# Patient Record
Sex: FEMALE | Race: WHITE | NOT HISPANIC OR LATINO | Employment: UNEMPLOYED | URBAN - NONMETROPOLITAN AREA
[De-identification: names, ages, dates, MRNs, and addresses within clinical notes are randomized per-mention and may not be internally consistent; named-entity substitution may affect disease eponyms.]

---

## 2017-01-09 ENCOUNTER — TRANSCRIBE ORDERS (OUTPATIENT)
Dept: CARDIOLOGY | Facility: CLINIC | Age: 47
End: 2017-01-09

## 2017-01-09 DIAGNOSIS — R00.0 TACHYCARDIA: Primary | ICD-10-CM

## 2017-02-02 ENCOUNTER — CONSULT (OUTPATIENT)
Dept: CARDIOLOGY | Facility: CLINIC | Age: 47
End: 2017-02-02

## 2017-02-02 VITALS
WEIGHT: 265 LBS | HEIGHT: 68 IN | SYSTOLIC BLOOD PRESSURE: 120 MMHG | HEART RATE: 84 BPM | DIASTOLIC BLOOD PRESSURE: 78 MMHG | BODY MASS INDEX: 40.16 KG/M2

## 2017-02-02 DIAGNOSIS — R06.02 SHORTNESS OF BREATH: ICD-10-CM

## 2017-02-02 DIAGNOSIS — I10 ESSENTIAL HYPERTENSION: ICD-10-CM

## 2017-02-02 DIAGNOSIS — E78.00 HYPERCHOLESTEREMIA: ICD-10-CM

## 2017-02-02 DIAGNOSIS — R00.2 PALPITATIONS: ICD-10-CM

## 2017-02-02 DIAGNOSIS — R53.82 CHRONIC FATIGUE: ICD-10-CM

## 2017-02-02 DIAGNOSIS — E11.9 TYPE 2 DIABETES MELLITUS WITHOUT COMPLICATION, WITHOUT LONG-TERM CURRENT USE OF INSULIN (HCC): ICD-10-CM

## 2017-02-02 DIAGNOSIS — E03.9 ACQUIRED HYPOTHYROIDISM: ICD-10-CM

## 2017-02-02 DIAGNOSIS — R07.89 CHEST TIGHTNESS: Primary | ICD-10-CM

## 2017-02-02 DIAGNOSIS — E88.81 METABOLIC SYNDROME: ICD-10-CM

## 2017-02-02 PROCEDURE — 99406 BEHAV CHNG SMOKING 3-10 MIN: CPT | Performed by: INTERNAL MEDICINE

## 2017-02-02 PROCEDURE — 99204 OFFICE O/P NEW MOD 45 MIN: CPT | Performed by: INTERNAL MEDICINE

## 2017-02-02 PROCEDURE — 93000 ELECTROCARDIOGRAM COMPLETE: CPT | Performed by: INTERNAL MEDICINE

## 2017-02-02 RX ORDER — DIAZEPAM 10 MG/1
10 TABLET ORAL 2 TIMES DAILY
COMMUNITY

## 2017-02-02 RX ORDER — MONTELUKAST SODIUM 10 MG/1
10 TABLET ORAL NIGHTLY
COMMUNITY

## 2017-02-02 RX ORDER — PROCHLORPERAZINE MALEATE 10 MG
10 TABLET ORAL 3 TIMES DAILY PRN
COMMUNITY

## 2017-02-02 RX ORDER — HYDROCODONE BITARTRATE AND ACETAMINOPHEN 7.5; 325 MG/1; MG/1
1 TABLET ORAL 2 TIMES DAILY
COMMUNITY

## 2017-02-02 RX ORDER — HYDROCHLOROTHIAZIDE 12.5 MG/1
12.5 TABLET ORAL DAILY
COMMUNITY

## 2017-02-02 RX ORDER — NICOTINE 21 MG/24HR
1 PATCH, TRANSDERMAL 24 HOURS TRANSDERMAL EVERY 24 HOURS
Qty: 30 PATCH | Refills: 8 | Status: SHIPPED | OUTPATIENT
Start: 2017-02-02

## 2017-02-02 RX ORDER — LEVOTHYROXINE SODIUM 0.07 MG/1
75 TABLET ORAL DAILY
COMMUNITY

## 2017-02-02 RX ORDER — QUETIAPINE FUMARATE 25 MG/1
25 TABLET, FILM COATED ORAL NIGHTLY
COMMUNITY

## 2017-02-02 RX ORDER — CHLORPHENIRAMINE MALEATE 4 MG/1
4 TABLET ORAL 2 TIMES DAILY
COMMUNITY

## 2017-02-02 RX ORDER — ALLOPURINOL 300 MG/1
300 TABLET ORAL DAILY
COMMUNITY

## 2017-02-02 RX ORDER — DICYCLOMINE HCL 20 MG
20 TABLET ORAL
COMMUNITY

## 2017-02-02 RX ORDER — TIZANIDINE 4 MG/1
4 TABLET ORAL 3 TIMES DAILY PRN
COMMUNITY

## 2017-02-02 RX ORDER — FEBUXOSTAT 40 MG/1
40 TABLET, FILM COATED ORAL DAILY
COMMUNITY

## 2017-02-02 RX ORDER — METOPROLOL SUCCINATE 50 MG/1
50 TABLET, EXTENDED RELEASE ORAL 2 TIMES DAILY
COMMUNITY

## 2017-02-02 RX ORDER — CLINDAMYCIN HYDROCHLORIDE 300 MG/1
300 CAPSULE ORAL 2 TIMES DAILY
COMMUNITY

## 2017-02-02 NOTE — PROGRESS NOTES
I advised the patient of the risks in continuing to use tobacco, and I provided this patient with smoking cessation educational materials.  I also discussed how to quit smoking and the patient has expressed the willingness to quit.      During this visit, I spent 3-10 mintues counseling the patient regarding smoking cessation.

## 2017-02-02 NOTE — PROGRESS NOTES
Chief Complaint   Patient presents with   • Establish Care     She reports had saw Dr Mckeon about 13 years ago, also saw another cardiologist in 2013, she is unable to recall MD name, had echo for surgery. She reports has had elevated B/P and heart rate, she states has stopped buspar with B/P decreasing and heart rate has decrease some yet elevated at times. She states since she has been back on pain medication and Valium B/P and heart rate is better.    • Chest Pain     She has had heaviness and tightness in chest with her anxiety attacks. Has some dizziness with sweating. She states is having 10 kidney stones every month, since partial nephrectomy.   • Palpitations     Having some palpitations.       CARDIAC COMPLAINTS  chest pressure/discomfort and palpitations        Subjective   Susie Granado is a 46 y.o. female came today for her initial cardiac evaluation.  She has history of hypertension, diabetes, hypothyroidism who also has history of multiple kidney stones.  She also has history of partial nephrectomy for renal cell cancer.  She has seen a cardiologist about 4 years ago when she underwent the nephrectomy and underwent echocardiogram as a pre op.  She did not have any cardiac symptoms at that time.  Recently, she started noticing increasing chest heaviness and chest tightness which occurs when she gets anxious.  She is not sure whether this happens because of the increased heart rate or just the anxiety itself.  She also started noticing increasing dizziness and increasing sweating during this time.  She has noticed increased palpitation, but it is not associated with chest pain.  She is not sure when was the last time she has undergone any lab work.  She unfortunately still continues to smoke.  Regarding her blood pressure, it has been fluctuating a lot.  She used to be on BuSpar but when she started noticing hypotension, apparently the BuSpar was stopped and now is been taking different medication.                Cardiac History  No past surgical history on file.    Current Outpatient Prescriptions   Medication Sig Dispense Refill   • allopurinol (ZYLOPRIM) 300 MG tablet Take 300 mg by mouth Daily.     • chlorpheniramine (CHLOR-TRIMETON) 4 MG tablet Take 4 mg by mouth 2 (Two) Times a Day.     • Cholecalciferol (VITAMIN D3) 5000 UNITS tablet Take  by mouth 2 (Two) Times a Day.     • clindamycin (CLEOCIN) 300 MG capsule Take 300 mg by mouth 2 (Two) Times a Day.     • diazePAM (VALIUM) 10 MG tablet Take 10 mg by mouth 2 (Two) Times a Day.     • DICLOFENAC PO Take 75 mg by mouth 2 (Two) Times a Day.     • dicyclomine (BENTYL) 20 MG tablet Take 20 mg by mouth 4 (Four) Times a Day Before Meals & at Bedtime As Needed.     • EPINEPHrine (EPIPEN IJ) Inject  as directed As Needed.     • febuxostat (ULORIC) 40 MG tablet Take 40 mg by mouth Daily.     • hydrochlorothiazide (HYDRODIURIL) 12.5 MG tablet Take 12.5 mg by mouth Daily.     • HYDROcodone-acetaminophen (NORCO) 7.5-325 MG per tablet Take 1 tablet by mouth 2 (Two) Times a Day.     • levothyroxine (SYNTHROID, LEVOTHROID) 75 MCG tablet Take 75 mcg by mouth Daily.     • linagliptin (TRADJENTA) 5 MG tablet tablet Take 5 mg by mouth Daily.     • metFORMIN (GLUCOPHAGE) 500 MG tablet Take 500 mg by mouth 2 (Two) Times a Day With Meals.     • metoprolol succinate XL (TOPROL-XL) 50 MG 24 hr tablet Take 50 mg by mouth 2 (Two) Times a Day.     • montelukast (SINGULAIR) 10 MG tablet Take 10 mg by mouth Every Night.     • prochlorperazine (COMPAZINE) 10 MG tablet Take 10 mg by mouth 3 (Three) Times a Day As Needed for nausea or vomiting.     • QUEtiapine (SEROquel) 25 MG tablet Take 25 mg by mouth Every Night.     • tiZANidine (ZANAFLEX) 4 MG tablet Take 4 mg by mouth 3 (Three) Times a Day As Needed for muscle spasms.     • nicotine (NICODERM CQ) 14 MG/24HR patch Place 1 patch on the skin Daily. 30 patch 8     No current facility-administered medications for this visit.         Allergies:  Bactrim [sulfamethoxazole-trimethoprim]; Erythromycin; Latex; and Rocephin [ceftriaxone]      Past Medical History   Diagnosis Date   • Anxiety    • Arthritis    • Asthma    • Cancer      renal cell carcinoma   • Chronic kidney disease      kidney stones, history   • Diabetes mellitus    • Gout    • Hx of cholecystectomy    • Hx of hysterectomy    • Hx of partial nephrectomy      right   • Hx of tonsillectomy    • Hypothyroidism    • Lumbago    • Migraine    • Tachycardia        Social History     Social History   • Marital status: Single     Spouse name: N/A   • Number of children: N/A   • Years of education: N/A     Occupational History   • Not on file.     Social History Main Topics   • Smoking status: Current Every Day Smoker     Packs/day: 1.00   • Smokeless tobacco: Never Used   • Alcohol use Yes      Comment: couple times a year   • Drug use: No   • Sexual activity: Not on file     Other Topics Concern   • Not on file     Social History Narrative   • No narrative on file       Family History   Problem Relation Age of Onset   • Heart failure Mother    • COPD Mother    • Hyperlipidemia Mother        Review of Systems   Constitutional: Positive for fatigue. Negative for activity change.   HENT: Negative for congestion.    Eyes: Negative for discharge.   Respiratory: Positive for shortness of breath. Negative for chest tightness.    Cardiovascular: Negative for chest pain.   Gastrointestinal: Negative for abdominal distention.   Endocrine: Negative for cold intolerance.   Genitourinary: Positive for dysuria. Negative for difficulty urinating.   Musculoskeletal: Positive for arthralgias and myalgias.   Allergic/Immunologic: Negative for environmental allergies.   Neurological: Negative for dizziness.   Hematological: Negative for adenopathy.   Psychiatric/Behavioral: Negative for agitation.       Diabetes- Yes  Thyroid- abnormal    Objective     Visit Vitals   • /78 (BP Location: Right arm)  "  • Pulse 84   • Ht 67.5\" (171.5 cm)   • Wt 265 lb (120 kg)   • BMI 40.89 kg/m2       Physical Exam   Constitutional: She appears well-developed.   HENT:   Head: Normocephalic.   Eyes: Pupils are equal, round, and reactive to light.   Neck: Normal range of motion.   Cardiovascular: Normal rate.    Murmur heard.  Pulmonary/Chest: Effort normal.   Musculoskeletal: Normal range of motion.   Neurological: She is alert.   Skin: Skin is warm.   Psychiatric: She has a normal mood and affect.         ECG 12 Lead  Date/Time: 2/2/2017 11:43 AM  Performed by: ALBERTO CHEN  Authorized by: ALBERTO CHEN   Previous ECG: no previous ECG available  Rhythm: sinus rhythm  Rate: normal  QRS axis: normal  Clinical impression: non-specific ECG                  Assessment/Plan   Her heart rate is upper limit of normal, blood pressure is stable.  She is already on beta blocker.  Her EKG showed sinus rhythm at 96 bpm with nonspecific ST-T changes.  Had a long talk with her about smoking.  I am not sure whether she'll be a candidate for Chantix, especially with a different medication she's been taking.  I talked to her about Nicoderm patches, and she wants to try using them.  I did send a prescription for that.  I also gave her papers to undergo some lab work.  I scheduled her to undergo an echocardiogram to evaluate the LV function, and also a stress test the form of Lexiscan, since she is not able to walk much.  Based on the results of these test, further recommendations will be made.  Susie was seen today for establish care, chest pain and palpitations.    Diagnoses and all orders for this visit:    Chest tightness  -     Stress Test With Myocardial Perfusion One Day; Future  -     High Sensitivity CRP; Future  -     Stress Test With Myocardial Perfusion One Day  -     High Sensitivity CRP    Essential hypertension  -     Comprehensive Metabolic Panel; Future  -     Comprehensive Metabolic Panel    Type 2 diabetes mellitus " without complication, without long-term current use of insulin  -     Stress Test With Myocardial Perfusion One Day; Future  -     Hemoglobin A1c; Future  -     Stress Test With Myocardial Perfusion One Day  -     Hemoglobin A1c    Chronic fatigue  -     Adult Transthoracic Echo Complete; Future  -     CBC & Differential; Future  -     Adult Transthoracic Echo Complete  -     CBC & Differential    Metabolic syndrome    Acquired hypothyroidism  -     TSH; Future  -     TSH    Shortness of breath  -     Adult Transthoracic Echo Complete; Future  -     Adult Transthoracic Echo Complete    Palpitations  -     TSH; Future  -     TSH    Hypercholesteremia  -     Lipid Panel; Future  -     Lipid Panel    Other orders  -     nicotine (NICODERM CQ) 14 MG/24HR patch; Place 1 patch on the skin Daily.                         Electronically signed by Adal Luis MD February 2, 2017 11:35 AM

## 2017-02-08 ENCOUNTER — OUTSIDE FACILITY SERVICE (OUTPATIENT)
Dept: CARDIOLOGY | Facility: CLINIC | Age: 47
End: 2017-02-08

## 2017-02-08 ENCOUNTER — HOSPITAL ENCOUNTER (OUTPATIENT)
Dept: CARDIOLOGY | Facility: HOSPITAL | Age: 47
Discharge: HOME | End: 2017-02-08
Attending: INTERNAL MEDICINE

## 2017-02-08 LAB
MAXIMAL PREDICTED HEART RATE: 176 BPM
STRESS TARGET HR: 150 BPM

## 2017-02-08 PROCEDURE — 25010000002 REGADENOSON 0.4 MG/5ML SOLUTION: Performed by: INTERNAL MEDICINE

## 2017-02-08 PROCEDURE — 93017 CV STRESS TEST TRACING ONLY: CPT

## 2017-02-08 PROCEDURE — A9500 TC99M SESTAMIBI: HCPCS | Performed by: INTERNAL MEDICINE

## 2017-02-08 PROCEDURE — 93306 TTE W/DOPPLER COMPLETE: CPT | Performed by: INTERNAL MEDICINE

## 2017-02-08 PROCEDURE — 78452 HT MUSCLE IMAGE SPECT MULT: CPT

## 2017-02-08 PROCEDURE — 78452 HT MUSCLE IMAGE SPECT MULT: CPT | Performed by: INTERNAL MEDICINE

## 2017-02-08 PROCEDURE — 93018 CV STRESS TEST I&R ONLY: CPT | Performed by: INTERNAL MEDICINE

## 2017-02-08 PROCEDURE — 0 TECHNETIUM SESTAMIBI: Performed by: INTERNAL MEDICINE

## 2017-02-08 PROCEDURE — 93306 TTE W/DOPPLER COMPLETE: CPT

## 2017-02-08 RX ADMIN — Medication 1 DOSE: at 09:45

## 2017-02-08 RX ADMIN — REGADENOSON 0.4 MG: 0.08 INJECTION, SOLUTION INTRAVENOUS at 09:45

## 2017-02-09 ENCOUNTER — TELEPHONE (OUTPATIENT)
Dept: CARDIOLOGY | Facility: CLINIC | Age: 47
End: 2017-02-09

## 2018-09-17 NOTE — TELEPHONE ENCOUNTER
Patient aware of stress test and echo results and recommendations.  Negative for ischemia, normal LV function.  Continue home medications.  
No